# Patient Record
Sex: MALE | Race: WHITE | NOT HISPANIC OR LATINO | Employment: OTHER | URBAN - METROPOLITAN AREA
[De-identification: names, ages, dates, MRNs, and addresses within clinical notes are randomized per-mention and may not be internally consistent; named-entity substitution may affect disease eponyms.]

---

## 2017-01-10 ENCOUNTER — OFFICE VISIT (OUTPATIENT)
Dept: INTERNAL MEDICINE | Facility: CLINIC | Age: 82
End: 2017-01-10
Payer: MEDICARE

## 2017-01-10 VITALS
RESPIRATION RATE: 16 BRPM | BODY MASS INDEX: 34.05 KG/M2 | SYSTOLIC BLOOD PRESSURE: 130 MMHG | DIASTOLIC BLOOD PRESSURE: 80 MMHG | HEART RATE: 64 BPM | WEIGHT: 211.88 LBS | HEIGHT: 66 IN | TEMPERATURE: 97 F

## 2017-01-10 DIAGNOSIS — J06.9 UPPER RESPIRATORY TRACT INFECTION, UNSPECIFIED TYPE: Primary | ICD-10-CM

## 2017-01-10 PROCEDURE — 3079F DIAST BP 80-89 MM HG: CPT | Mod: S$GLB,,, | Performed by: FAMILY MEDICINE

## 2017-01-10 PROCEDURE — 1159F MED LIST DOCD IN RCRD: CPT | Mod: S$GLB,,, | Performed by: FAMILY MEDICINE

## 2017-01-10 PROCEDURE — 99999 PR PBB SHADOW E&M-EST. PATIENT-LVL III: CPT | Mod: PBBFAC,,, | Performed by: FAMILY MEDICINE

## 2017-01-10 PROCEDURE — 99214 OFFICE O/P EST MOD 30 MIN: CPT | Mod: S$GLB,,, | Performed by: FAMILY MEDICINE

## 2017-01-10 PROCEDURE — 1157F ADVNC CARE PLAN IN RCRD: CPT | Mod: S$GLB,,, | Performed by: FAMILY MEDICINE

## 2017-01-10 PROCEDURE — 3075F SYST BP GE 130 - 139MM HG: CPT | Mod: S$GLB,,, | Performed by: FAMILY MEDICINE

## 2017-01-10 PROCEDURE — 1160F RVW MEDS BY RX/DR IN RCRD: CPT | Mod: S$GLB,,, | Performed by: FAMILY MEDICINE

## 2017-01-10 RX ORDER — FLUTICASONE PROPIONATE 50 MCG
2 SPRAY, SUSPENSION (ML) NASAL DAILY
Qty: 16 G | Refills: 11 | Status: SHIPPED | OUTPATIENT
Start: 2017-01-10 | End: 2017-02-09

## 2017-01-10 RX ORDER — PROMETHAZINE HYDROCHLORIDE AND DEXTROMETHORPHAN HYDROBROMIDE 6.25; 15 MG/5ML; MG/5ML
5 SYRUP ORAL 4 TIMES DAILY PRN
Qty: 240 ML | Refills: 0 | Status: SHIPPED | OUTPATIENT
Start: 2017-01-10 | End: 2017-01-20

## 2017-01-10 RX ORDER — AMOXICILLIN AND CLAVULANATE POTASSIUM 600; 42.9 MG/5ML; MG/5ML
875 POWDER, FOR SUSPENSION ORAL 2 TIMES DAILY
Qty: 140 ML | Refills: 0 | Status: SHIPPED | OUTPATIENT
Start: 2017-01-10 | End: 2017-01-20

## 2017-01-10 RX ORDER — DEXTROMETHORPHAN HBR AND GUAIFENESIN 5; 100 MG/5ML; MG/5ML
5 LIQUID ORAL 2 TIMES DAILY
COMMUNITY

## 2017-01-10 NOTE — PATIENT INSTRUCTIONS
D/C Claritin.  Continue Zyrtec 10 mg PO nightly.  Start Flonase nasal spray 2 sprays per nostril daily.

## 2017-01-10 NOTE — PROGRESS NOTES
Subjective:       Patient ID: Salty Farnsworth is a 82 y.o. male.    Chief Complaint: Cough    HPI 82-year-old white male [facility] resident presents to clinic today accompanied by his daughter secondary to a complaint of decreased appetite, fatigue, nasal congestion, was nasal drip, runny nose, cough, and chest congestion worsening since Saturday.  He has been using Mucinex with minimal relief.  There is concerned secondary to multiple sick residents at [facility].   Review of Systems   Constitutional: Positive for appetite change and fatigue. Negative for chills and fever.   HENT: Positive for congestion, postnasal drip and rhinorrhea. Negative for ear pain, hearing loss, sinus pressure, sore throat and tinnitus.    Eyes: Negative for redness, itching and visual disturbance.   Respiratory: Positive for cough and chest tightness. Negative for shortness of breath.    Cardiovascular: Negative for chest pain and palpitations.   Gastrointestinal: Negative for abdominal pain, constipation, diarrhea, nausea and vomiting.   Genitourinary: Negative for decreased urine volume, difficulty urinating, dysuria, frequency, hematuria and urgency.   Musculoskeletal: Negative for back pain, myalgias, neck pain and neck stiffness.   Skin: Negative for rash.   Neurological: Negative for dizziness, light-headedness and headaches.   Psychiatric/Behavioral: Negative.        Objective:      Physical Exam   Constitutional: He is oriented to person, place, and time. He appears well-developed and well-nourished. No distress.   HENT:   Head: Normocephalic and atraumatic.   Right Ear: Hearing, tympanic membrane, external ear and ear canal normal.   Left Ear: Hearing, tympanic membrane, external ear and ear canal normal.   Nose: Mucosal edema and rhinorrhea present. No nose lacerations, sinus tenderness, nasal deformity, septal deviation or nasal septal hematoma. No epistaxis.  No foreign bodies. Right sinus exhibits no maxillary sinus  tenderness and no frontal sinus tenderness. Left sinus exhibits no maxillary sinus tenderness and no frontal sinus tenderness.   Mouth/Throat: Oropharynx is clear and moist. No oropharyngeal exudate.   Eyes: Conjunctivae and EOM are normal. Pupils are equal, round, and reactive to light. Right eye exhibits no discharge. Left eye exhibits no discharge. No scleral icterus.   Neck: Normal range of motion. Neck supple. No JVD present. No tracheal deviation present. No thyromegaly present.   Cardiovascular: Normal rate, regular rhythm, normal heart sounds and intact distal pulses.  Exam reveals no gallop and no friction rub.    No murmur heard.  Pulmonary/Chest: Effort normal and breath sounds normal. No stridor. No respiratory distress. He has no wheezes. He has no rales.   Abdominal: Soft. Bowel sounds are normal. He exhibits no distension and no mass. There is no tenderness. There is no rebound and no guarding.   Musculoskeletal: Normal range of motion. He exhibits no edema or tenderness.   Lymphadenopathy:     He has no cervical adenopathy.   Neurological: He is alert and oriented to person, place, and time.   Skin: Skin is warm and dry. No rash noted. He is not diaphoretic. No erythema. No pallor.   Psychiatric: He has a normal mood and affect. His behavior is normal. Judgment and thought content normal.   Nursing note and vitals reviewed.      Assessment:       1. Upper respiratory tract infection, unspecified type        Plan:       Upper respiratory tract infection, unspecified type  -     amoxicillin-clavulanate (AUGMENTIN) 600-42.9 mg/5 mL SusR; Take 7 mLs (840 mg total) by mouth 2 (two) times daily.  Dispense: 140 mL; Refill: 0  -     fluticasone (FLONASE) 50 mcg/actuation nasal spray; 2 sprays by Each Nare route once daily.  Dispense: 16 g; Refill: 11  -     promethazine-dextromethorphan (PROMETHAZINE-DM) 6.25-15 mg/5 mL Syrp; Take 5 mLs by mouth 4 (four) times daily as needed (Cough).  Dispense: 240 mL;  Refill: 0       Tylenol as needed for fever or pain.  Continue over-the-counter Zyrtec nightly.  Saltwater or Listerine gargle as needed for sore throat.  Return to clinic as needed if symptoms persist or worsen.

## 2017-01-10 NOTE — MR AVS SNAPSHOT
[facility]   [facility]  [place] [address]  Phone: [phone]  Fax: [phone]                  Salty Farnsworth   1/10/2017 2:20 PM   Office Visit    Description:  Male : 1934   Provider:  Hernesto Mason MD   Department:  [facility]           Reason for Visit     Cough           Diagnoses this Visit        Comments    Upper respiratory tract infection, unspecified type    -  Primary            To Do List           Future Appointments        Provider Department Dept Phone    2017 8:00 AM HOME MONITOR DEVICE CHECK, [facility] - Arrhythmia [phone]    3/21/2017 3:00 PM Jonelle Mcdonald MD [facility] Dermatology [phone]      Goals (5 Years of Data)     None      Follow-Up and Disposition     Return if symptoms worsen or fail to improve.       These Medications        Disp Refills Start End    amoxicillin-clavulanate (AUGMENTIN) 600-42.9 mg/5 mL SusR 140 mL 0 1/10/2017 2017    Take 7 mLs (840 mg total) by mouth 2 (two) times daily. - Oral    Pharmacy: [facility] - [place], [place] - [address] Ph #: [phone]       fluticasone (FLONASE) 50 mcg/actuation nasal spray 16 g 11 1/10/2017 2017    2 sprays by Each Nare route once daily. - Each Nare    Pharmacy: [facility] - [place], [place] - [address] Ph #: [phone]       promethazine-dextromethorphan (PROMETHAZINE-DM) 6.25-15 mg/5 mL Syrp 240 mL 0 1/10/2017 2017    Take 5 mLs by mouth 4 (four) times daily as needed (Cough). - Oral    Pharmacy: [facility] - [place], [place] - [address] Ph #: [phone]         [facility] On Call     [facility] On Call Nurse Care Line -  Assistance  Registered nurses in the [facility] On Call Center provide clinical advisement, health education, appointment booking, and other advisory services.  Call for this free service at [phone].             Medications            Message regarding Medications     Verify the changes and/or additions to your medication regime listed below are the same as discussed with your clinician today.  If any of these changes or additions are incorrect, please notify your healthcare provider.        START taking these NEW medications        Refills    amoxicillin-clavulanate (AUGMENTIN) 600-42.9 mg/5 mL SusR 0    Sig: Take 7 mLs (840 mg total) by mouth 2 (two) times daily.    Class: Normal    Route: Oral    fluticasone (FLONASE) 50 mcg/actuation nasal spray 11    Si sprays by Each Nare route once daily.    Class: Normal    Route: Each Nare    promethazine-dextromethorphan (PROMETHAZINE-DM) 6.25-15 mg/5 mL Syrp 0    Sig: Take 5 mLs by mouth 4 (four) times daily as needed (Cough).    Class: Normal    Route: Oral           Verify that the below list of medications is an accurate representation of the medications you are currently taking.  If none reported, the list may be blank. If incorrect, please contact your healthcare provider. Carry this list with you in case of emergency.           Current Medications     apixaban 5 mg Tab Take 1 tablet (5 mg total) by mouth 2 (two) times daily.    aspirin (ECOTRIN) 81 MG EC tablet Take 81 mg by mouth once daily.      cetirizine (ZYRTEC) 10 MG tablet Take 10 mg by mouth once daily.    dextromethorphan-guaifenesin 5-100 mg/5 mL Liqd Take 5 mLs by mouth 2 (two) times daily.    donepezil (ARICEPT) 10 MG tablet TAKE 1 TABLET BY MOUTH EVERY DAY WITH FOOD    finasteride (PROSCAR) 5 mg tablet TAKE 1 TABLET BY MOUTH EVERY DAY    food supplemt, lactose-reduced (ENSURE ACTIVE MUSCLE HEALTH) Liqd Take 118 mLs by mouth 3 (three) times daily with meals.    furosemide (LASIX) 40 MG tablet Take 1 tablet (40 mg total) by mouth once daily.    melatonin 3 mg Lozg Take 1 capsule by mouth every evening.     memantine (NAMENDA) 10 MG Tab Take 1 tablet (10 mg total) by mouth 2 (two) times daily.    paroxetine (PAXIL) 10 MG  "tablet Take 1 tablet (10 mg total) by mouth every morning.    pravastatin (PRAVACHOL) 40 MG tablet Take 1 tablet (40 mg total) by mouth every evening.    amoxicillin-clavulanate (AUGMENTIN) 600-42.9 mg/5 mL SusR Take 7 mLs (840 mg total) by mouth 2 (two) times daily.    fluticasone (FLONASE) 50 mcg/actuation nasal spray 2 sprays by Each Nare route once daily.    promethazine-dextromethorphan (PROMETHAZINE-DM) 6.25-15 mg/5 mL Syrp Take 5 mLs by mouth 4 (four) times daily as needed (Cough).           Clinical Reference Information           Vital Signs - Last Recorded  Most recent update: 1/10/2017  2:45 PM by Jenna aNils LPN    BP Pulse Temp Resp Ht Wt    130/80 (BP Location: Left arm, Patient Position: Sitting, BP Method: Manual) 64 97.4 °F (36.3 °C) (Oral) 16 5' 6" (1.676 m) 96.1 kg (211 lb 13.8 oz)    BMI                34.2 kg/m2          Blood Pressure          Most Recent Value    BP  130/80      Allergies as of 1/10/2017     No Known Allergies      Immunizations Administered on Date of Encounter - 1/10/2017     None      Instructions    D/C Claritin.  Continue Zyrtec 10 mg PO nightly.  Start Flonase nasal spray 2 sprays per nostril daily.        "

## 2017-01-18 ENCOUNTER — TELEPHONE (OUTPATIENT)
Dept: ELECTROPHYSIOLOGY | Facility: CLINIC | Age: 82
End: 2017-01-18

## 2017-01-18 ENCOUNTER — CLINICAL SUPPORT (OUTPATIENT)
Dept: ELECTROPHYSIOLOGY | Facility: CLINIC | Age: 82
End: 2017-01-18
Payer: MEDICARE

## 2017-01-18 DIAGNOSIS — Z95.0 CARDIAC PACEMAKER IN SITU: ICD-10-CM

## 2017-01-18 DIAGNOSIS — R00.1 BRADYCARDIA: ICD-10-CM

## 2017-01-18 PROCEDURE — 93296 REM INTERROG EVL PM/IDS: CPT | Mod: S$GLB,,, | Performed by: INTERNAL MEDICINE

## 2017-01-18 PROCEDURE — 93294 REM INTERROG EVL PM/LDLS PM: CPT | Mod: S$GLB,,, | Performed by: INTERNAL MEDICINE

## 2017-01-18 NOTE — TELEPHONE ENCOUNTER
I spoke with patient daughter and asked if she could send his home pacemaker transmission. She informed me that she will send it this afternoon.

## 2017-02-10 ENCOUNTER — TELEPHONE (OUTPATIENT)
Dept: DERMATOLOGY | Facility: CLINIC | Age: 82
End: 2017-02-10

## 2017-02-10 NOTE — TELEPHONE ENCOUNTER
Left message for patients daughter Krystian to return to reschedule fathers appointment.----- Message from Lianne Cordero sent at 2/8/2017  3:38 PM CST -----  Contact: pts daughter/krystian Rodríguez pt-Pts daughter received a message today that her fathers appt wants changes and that is not a good day.  Please call april Ledesma to reschedule at [phone].

## 2017-02-20 ENCOUNTER — PATIENT MESSAGE (OUTPATIENT)
Dept: INTERNAL MEDICINE | Facility: CLINIC | Age: 82
End: 2017-02-20

## 2017-02-23 ENCOUNTER — TELEPHONE (OUTPATIENT)
Dept: INTERNAL MEDICINE | Facility: CLINIC | Age: 82
End: 2017-02-23

## 2017-02-23 ENCOUNTER — PATIENT MESSAGE (OUTPATIENT)
Dept: INTERNAL MEDICINE | Facility: CLINIC | Age: 82
End: 2017-02-23

## 2017-02-23 NOTE — TELEPHONE ENCOUNTER
----- Message from Althea Burkett sent at 2/23/2017  8:50 AM CST -----  Contact: pt daughter Callie [phone]  Pt daughter will like a call for the pt to receive orders to start receiving physical therapy,please advise

## 2017-02-23 NOTE — TELEPHONE ENCOUNTER
She also emailed us 2/20 and no once responded.  Her dad last saw you in august and had therapy with [facility] in October /november and it help  Him immensely and daughter is requesting home therapy again for debility.    Ok to order?    (i told her we'd call once we got a response from dr duran)    Thanks cuco

## 2017-03-10 RX ORDER — DONEPEZIL HYDROCHLORIDE 10 MG/1
10 TABLET, FILM COATED ORAL DAILY
Qty: 30 TABLET | Refills: 6 | Status: SHIPPED | OUTPATIENT
Start: 2017-03-10 | End: 2017-10-04 | Stop reason: SDUPTHER

## 2017-03-10 RX ORDER — PRAVASTATIN SODIUM 40 MG/1
40 TABLET ORAL NIGHTLY
Qty: 30 TABLET | Refills: 6 | Status: SHIPPED | OUTPATIENT
Start: 2017-03-10 | End: 2018-01-02 | Stop reason: SDUPTHER

## 2017-03-14 ENCOUNTER — LAB VISIT (OUTPATIENT)
Dept: LAB | Facility: HOSPITAL | Age: 82
End: 2017-03-14
Attending: INTERNAL MEDICINE
Payer: MEDICARE

## 2017-03-14 ENCOUNTER — OFFICE VISIT (OUTPATIENT)
Dept: INTERNAL MEDICINE | Facility: CLINIC | Age: 82
End: 2017-03-14
Payer: MEDICARE

## 2017-03-14 VITALS
DIASTOLIC BLOOD PRESSURE: 80 MMHG | SYSTOLIC BLOOD PRESSURE: 110 MMHG | HEIGHT: 66 IN | TEMPERATURE: 97 F | RESPIRATION RATE: 18 BRPM | HEART RATE: 59 BPM | WEIGHT: 211.88 LBS | BODY MASS INDEX: 34.05 KG/M2

## 2017-03-14 DIAGNOSIS — I50.22 CHRONIC SYSTOLIC HEART FAILURE: Chronic | ICD-10-CM

## 2017-03-14 DIAGNOSIS — M48.061 NEURAL FORAMINAL STENOSIS OF LUMBAR SPINE: ICD-10-CM

## 2017-03-14 DIAGNOSIS — I48.0 PAROXYSMAL ATRIAL FIBRILLATION: ICD-10-CM

## 2017-03-14 DIAGNOSIS — R53.81 DEBILITY: Primary | ICD-10-CM

## 2017-03-14 DIAGNOSIS — R41.3 MEMORY LOSS: Chronic | ICD-10-CM

## 2017-03-14 LAB
ALBUMIN SERPL BCP-MCNC: 3.5 G/DL
ALP SERPL-CCNC: 74 U/L
ALT SERPL W/O P-5'-P-CCNC: 8 U/L
ANION GAP SERPL CALC-SCNC: 8 MMOL/L
AST SERPL-CCNC: 11 U/L
BILIRUB SERPL-MCNC: 0.5 MG/DL
BUN SERPL-MCNC: 21 MG/DL
CALCIUM SERPL-MCNC: 8.8 MG/DL
CHLORIDE SERPL-SCNC: 109 MMOL/L
CO2 SERPL-SCNC: 26 MMOL/L
CREAT SERPL-MCNC: 1.3 MG/DL
EST. GFR  (AFRICAN AMERICAN): 58.7 ML/MIN/1.73 M^2
EST. GFR  (NON AFRICAN AMERICAN): 50.8 ML/MIN/1.73 M^2
GLUCOSE SERPL-MCNC: 92 MG/DL
POTASSIUM SERPL-SCNC: 4.1 MMOL/L
PROT SERPL-MCNC: 7 G/DL
SODIUM SERPL-SCNC: 143 MMOL/L

## 2017-03-14 PROCEDURE — 36415 COLL VENOUS BLD VENIPUNCTURE: CPT | Mod: PO

## 2017-03-14 PROCEDURE — 1157F ADVNC CARE PLAN IN RCRD: CPT | Mod: S$GLB,,, | Performed by: INTERNAL MEDICINE

## 2017-03-14 PROCEDURE — 3079F DIAST BP 80-89 MM HG: CPT | Mod: S$GLB,,, | Performed by: INTERNAL MEDICINE

## 2017-03-14 PROCEDURE — 3074F SYST BP LT 130 MM HG: CPT | Mod: S$GLB,,, | Performed by: INTERNAL MEDICINE

## 2017-03-14 PROCEDURE — 1160F RVW MEDS BY RX/DR IN RCRD: CPT | Mod: S$GLB,,, | Performed by: INTERNAL MEDICINE

## 2017-03-14 PROCEDURE — 99499 UNLISTED E&M SERVICE: CPT | Mod: S$GLB,,, | Performed by: INTERNAL MEDICINE

## 2017-03-14 PROCEDURE — 1126F AMNT PAIN NOTED NONE PRSNT: CPT | Mod: S$GLB,,, | Performed by: INTERNAL MEDICINE

## 2017-03-14 PROCEDURE — 1159F MED LIST DOCD IN RCRD: CPT | Mod: S$GLB,,, | Performed by: INTERNAL MEDICINE

## 2017-03-14 PROCEDURE — 99999 PR PBB SHADOW E&M-EST. PATIENT-LVL III: CPT | Mod: PBBFAC,,,

## 2017-03-14 PROCEDURE — 80053 COMPREHEN METABOLIC PANEL: CPT

## 2017-03-14 PROCEDURE — 99214 OFFICE O/P EST MOD 30 MIN: CPT | Mod: S$GLB,,, | Performed by: INTERNAL MEDICINE

## 2017-03-14 NOTE — PROGRESS NOTES
Patient seen and examined with resident and agree with assessment and plan.    1.  Debility  2.  Memory loss  3.  Chronic systolic heart failure  4.  Neural foraminal stenosis  5. Paroxysmal a fib    Home health referral for PT/OT/ST, nurse, aide, .  Lasix as needed monitored by daughters.  Check CMP today.

## 2017-03-14 NOTE — PROGRESS NOTES
Subjective:       Patient ID: Salty Farnsworth is a 82 y.o. male.    Chief Complaint: Follow-up (would like to have PT restarted)    HPI     Mr. Farnsworth presents with his daughter to clinic to obtain Home Health Referall to continue PT. He had HH PT after a hospitalization last year but it has since . She notices that he is more skakey, unsteady and weak without the PT. He lives in an assisted living facility.   Another daughter is also concerned about his legs. He takes lasix as needed for swelling due to systolic CHF. His daughters measure his legs and he takes the lasix if circumference is greater than 11 inches.   He has atrial fibrillation noted on pacemaker interrogation. He follows with cardiology for this. He is on apixiban.   Review of Systems   Unable to perform ROS: Dementia       Objective:      Physical Exam   Constitutional: He appears well-developed and well-nourished.   HENT:   Head: Normocephalic and atraumatic.   Mouth/Throat: No oropharyngeal exudate.   Eyes: Conjunctivae and EOM are normal. Pupils are equal, round, and reactive to light.   Pupils 3mm - equally reactive to light, constrict to 2mm   Neck: Normal range of motion. Neck supple. No thyromegaly present.   Cardiovascular: Normal rate, regular rhythm and normal heart sounds.    No murmur heard.  Pulmonary/Chest: Effort normal and breath sounds normal. He has no wheezes.   Abdominal: Soft. Bowel sounds are normal. He exhibits no distension. There is no tenderness.   Musculoskeletal: Normal range of motion. He exhibits edema (1+ BLE).   Neurological: He is alert.   Skin: Skin is warm and dry. No rash noted.   Vitals reviewed.      Assessment:       1. Debility    2. Memory loss    3. Chronic systolic heart failure    4. Neural foraminal stenosis of lumbar spine, at left L3-L4.    5. Paroxysmal atrial fibrillation        Plan:       Referral for home health to continue PT, speech therapy for swallowing, OT, SW to provide aid and future  planning, nursing for VS and weight check.  Continue lasix prn. Check CMP today to assess electrolytes, kidney function, and transaminases to eval for any passive liver congestion.   Continue apixiban for paroxsymal atrial fibrillation. Keep cardiology follow up with 2DE monitoring of systolic heart failure.    Will call patient's daughter Callie with lab results and release to patient portal.    Discussed with Dr. Cooper Banks MD   IM PGY3

## 2017-03-15 ENCOUNTER — TELEPHONE (OUTPATIENT)
Dept: INTERNAL MEDICINE | Facility: CLINIC | Age: 82
End: 2017-03-15

## 2017-03-20 ENCOUNTER — TELEPHONE (OUTPATIENT)
Dept: ADMINISTRATIVE | Facility: CLINIC | Age: 82
End: 2017-03-20

## 2017-03-22 ENCOUNTER — LAB VISIT (OUTPATIENT)
Dept: LAB | Facility: HOSPITAL | Age: 82
End: 2017-03-22
Attending: INTERNAL MEDICINE
Payer: MEDICARE

## 2017-03-22 ENCOUNTER — TELEPHONE (OUTPATIENT)
Dept: INTERNAL MEDICINE | Facility: CLINIC | Age: 82
End: 2017-03-22

## 2017-03-22 DIAGNOSIS — N39.0 URINARY TRACT INFECTION, SITE NOT SPECIFIED: Primary | ICD-10-CM

## 2017-03-22 PROCEDURE — 81001 URINALYSIS AUTO W/SCOPE: CPT

## 2017-03-22 PROCEDURE — 87086 URINE CULTURE/COLONY COUNT: CPT

## 2017-03-22 NOTE — TELEPHONE ENCOUNTER
----- Message from Donna Hodge sent at 3/22/2017  8:21 AM CDT -----  Contact: [facility] [place] xt [phone]  Pt was admitted to home health service on 3/15/17 and needed therapy only at that time. Daughter reported that patient has blood in his urine this morning. [facility] is requesting an order to visit patient and also to collect a specimen/ cath to check for a uti.

## 2017-03-23 ENCOUNTER — TELEPHONE (OUTPATIENT)
Dept: INTERNAL MEDICINE | Facility: CLINIC | Age: 82
End: 2017-03-23

## 2017-03-23 DIAGNOSIS — R31.9 HEMATURIA: Primary | ICD-10-CM

## 2017-03-23 LAB
BACTERIA #/AREA URNS AUTO: ABNORMAL /HPF
BILIRUB UR QL STRIP: NEGATIVE
CAOX CRY UR QL COMP ASSIST: ABNORMAL
CLARITY UR REFRACT.AUTO: ABNORMAL
COLOR UR AUTO: ABNORMAL
GLUCOSE UR QL STRIP: NEGATIVE
HGB UR QL STRIP: ABNORMAL
HYALINE CASTS UR QL AUTO: 0 /LPF
KETONES UR QL STRIP: NEGATIVE
LEUKOCYTE ESTERASE UR QL STRIP: NEGATIVE
MICROSCOPIC COMMENT: ABNORMAL
NITRITE UR QL STRIP: NEGATIVE
PH UR STRIP: 5 [PH] (ref 5–8)
PROT UR QL STRIP: ABNORMAL
RBC #/AREA URNS AUTO: >100 /HPF (ref 0–4)
SP GR UR STRIP: 1.02 (ref 1–1.03)
URN SPEC COLLECT METH UR: ABNORMAL
UROBILINOGEN UR STRIP-ACNC: ABNORMAL EU/DL
WBC #/AREA URNS AUTO: 2 /HPF (ref 0–5)

## 2017-03-23 NOTE — TELEPHONE ENCOUNTER
Patient has a lot of blood in his urine.  There are no signs of infection.  Let's await urine culture.  Need to repeat this Monday.  If it is still bloody, going to refer to urology.  Released to patient portal.

## 2017-03-24 LAB — BACTERIA UR CULT: NO GROWTH

## 2017-03-28 ENCOUNTER — TELEPHONE (OUTPATIENT)
Dept: INTERNAL MEDICINE | Facility: CLINIC | Age: 82
End: 2017-03-28

## 2017-03-28 ENCOUNTER — OFFICE VISIT (OUTPATIENT)
Dept: UROLOGY | Facility: CLINIC | Age: 82
End: 2017-03-28
Payer: MEDICARE

## 2017-03-28 VITALS
WEIGHT: 210 LBS | BODY MASS INDEX: 31.83 KG/M2 | SYSTOLIC BLOOD PRESSURE: 124 MMHG | DIASTOLIC BLOOD PRESSURE: 59 MMHG | HEIGHT: 68 IN | HEART RATE: 54 BPM

## 2017-03-28 DIAGNOSIS — N40.1 BPH (BENIGN PROSTATIC HYPERTROPHY) WITH URINARY OBSTRUCTION: Primary | Chronic | ICD-10-CM

## 2017-03-28 DIAGNOSIS — R31.0 HEMATURIA, GROSS: ICD-10-CM

## 2017-03-28 DIAGNOSIS — N13.8 BPH (BENIGN PROSTATIC HYPERTROPHY) WITH URINARY OBSTRUCTION: Primary | Chronic | ICD-10-CM

## 2017-03-28 PROCEDURE — 1157F ADVNC CARE PLAN IN RCRD: CPT | Mod: S$GLB,,, | Performed by: UROLOGY

## 2017-03-28 PROCEDURE — 99204 OFFICE O/P NEW MOD 45 MIN: CPT | Mod: S$GLB,,, | Performed by: UROLOGY

## 2017-03-28 PROCEDURE — 1126F AMNT PAIN NOTED NONE PRSNT: CPT | Mod: S$GLB,,, | Performed by: UROLOGY

## 2017-03-28 PROCEDURE — 3074F SYST BP LT 130 MM HG: CPT | Mod: S$GLB,,, | Performed by: UROLOGY

## 2017-03-28 PROCEDURE — 99999 PR PBB SHADOW E&M-EST. PATIENT-LVL III: CPT | Mod: PBBFAC,,, | Performed by: UROLOGY

## 2017-03-28 PROCEDURE — 1159F MED LIST DOCD IN RCRD: CPT | Mod: S$GLB,,, | Performed by: UROLOGY

## 2017-03-28 PROCEDURE — 3078F DIAST BP <80 MM HG: CPT | Mod: S$GLB,,, | Performed by: UROLOGY

## 2017-03-28 PROCEDURE — 1160F RVW MEDS BY RX/DR IN RCRD: CPT | Mod: S$GLB,,, | Performed by: UROLOGY

## 2017-03-28 RX ORDER — LIDOCAINE HYDROCHLORIDE 20 MG/ML
JELLY TOPICAL ONCE
Status: CANCELLED | OUTPATIENT
Start: 2017-03-28 | End: 2017-03-28

## 2017-03-28 RX ORDER — CIPROFLOXACIN 250 MG/1
500 TABLET, FILM COATED ORAL ONCE
Status: CANCELLED | OUTPATIENT
Start: 2017-03-28 | End: 2017-03-28

## 2017-03-28 RX ORDER — POLYETHYLENE GLYCOL 3350 17 G/17G
POWDER, FOR SOLUTION ORAL
COMMUNITY

## 2017-03-28 NOTE — TELEPHONE ENCOUNTER
----- Message from Sharlene Nance sent at 3/28/2017  8:26 AM CDT -----  Contact: Callie/ daughter/ [phone]  Daughter would like to speak with Dr. Gamboa about her father appointment with the urologist.

## 2017-03-28 NOTE — TELEPHONE ENCOUNTER
----- Message from Sharlene Nance sent at 3/28/2017  8:26 AM CDT -----  Contact: Callie/ daughter/ [phone]  Daughter would like to speak with Dr. Gamboa about her father appointment with the urologist.

## 2017-03-28 NOTE — LETTER
March 28, 2017      Deepak Gamboa MD  [address] [facility] [place] [address]           [facility] - Urology 4th Floor  [address] [place]  [place] [address]  Phone: [phone]          Patient: Salty Farnsworth   MR Number: [id number]   YOB: 1934   Date of Visit: 3/28/2017       Dear Dr. Deepak Gamboa:    Thank you for referring Salty Farnsworth to me for evaluation. Attached you will find relevant portions of my assessment and plan of care.    If you have questions, please do not hesitate to call me. I look forward to following Salty Farnsworth along with you.    Sincerely,    Wilfredo Ledezma MD    Enclosure  CC:  No Recipients    If you would like to receive this communication electronically, please contact [email or web address] or [phone] to request more information on [facility] access.    For providers and/or their staff who would like to refer a patient to [facility], please contact us through our one-stop-shop provider referral line, Pastora Wilson, at [phone].    If you feel you have received this communication in error or would no longer like to receive these types of communications, please e-mail [email or web address]

## 2017-03-28 NOTE — PROGRESS NOTES
CC: hematuria    Salty Farnsworth is a 82 y.o. man who is here for the evaluation of Hematuria (painless hematuria started about 2 weeks ago, lives at Assisted Living)  a new pt referred by his PCP.  Hx of severe dementia, AF on Eliquis anti-coagulation therapy.  Uses a diaper for urination, and his diapers showed bloody urine for the past 2 wks or so.  Denies dysuria or fever, but unable to assess adequately because he does not talk much.  He is accompanied by his two daughters today.  Has been on Finasteride for BPH.    Past Medical History:   Diagnosis Date    Anxiety 8/13/2014    Arthritis     Cataract     CHF (congestive heart failure)     Dementia     Hematuria     Hyperlipidemia     Hypertension     Oropharyngeal dysphagia 12/4/2015    Aspiration of thin liquids    Pacemaker     Sleep apnea     Weakness      Past Surgical History:   Procedure Laterality Date    CARDIAC PACEMAKER PLACEMENT      HERNIA REPAIR       Social History   Substance Use Topics    Smoking status: Never Smoker    Smokeless tobacco: Never Used    Alcohol use No     Family History   Problem Relation Age of Onset    Dementia Mother     Cataracts Mother     Dementia Sister     Stroke Sister      ischemic stroke    Cataracts Father     Heart disease Father      MI    Melanoma Neg Hx     Amblyopia Neg Hx     Blindness Neg Hx     Diabetes Neg Hx     Glaucoma Neg Hx     Hypertension Neg Hx     Macular degeneration Neg Hx     Retinal detachment Neg Hx     Strabismus Neg Hx     Thyroid disease Neg Hx     Cancer Neg Hx     Colon cancer Neg Hx     Prostate cancer Neg Hx     Hyperlipidemia Neg Hx      Allergy:  Review of patient's allergies indicates:  No Known Allergies  Outpatient Encounter Prescriptions as of 3/28/2017   Medication Sig Dispense Refill    cetirizine (ZYRTEC) 10 MG tablet Take 10 mg by mouth once daily.      dextromethorphan-guaifenesin 5-100 mg/5 mL Liqd Take 5 mLs by mouth 2 (two) times daily.       donepezil (ARICEPT) 10 MG tablet Take 1 tablet (10 mg total) by mouth once daily. 30 tablet 6    finasteride (PROSCAR) 5 mg tablet TAKE 1 TABLET BY MOUTH EVERY DAY 30 tablet 0    food supplemt, lactose-reduced (ENSURE ACTIVE MUSCLE HEALTH) Liqd Take 118 mLs by mouth 3 (three) times daily with meals. 90 Can 5    furosemide (LASIX) 40 MG tablet Take 1 tablet (40 mg total) by mouth once daily. (Patient taking differently: Take 40 mg by mouth as needed. ) 30 tablet 11    melatonin 3 mg Lozg Take 1 capsule by mouth every evening.       memantine (NAMENDA) 10 MG Tab Take 1 tablet (10 mg total) by mouth 2 (two) times daily. 60 tablet 11    paroxetine (PAXIL) 10 MG tablet Take 1 tablet (10 mg total) by mouth every morning. 30 tablet 11    polyethylene glycol (GLYCOLAX) 17 gram PwPk Take by mouth.      pravastatin (PRAVACHOL) 40 MG tablet Take 1 tablet (40 mg total) by mouth every evening. 30 tablet 6    apixaban 5 mg Tab Take 1 tablet (5 mg total) by mouth 2 (two) times daily. 60 tablet 11     No facility-administered encounter medications on file as of 3/28/2017.      Review of Systems   General ROS: GENERAL:  No weight gain or loss  SKIN:  No rashes or lacerations  HEAD:  No headaches  EYES:  No exophthalmos, jaundice or blindness  EARS:  No dizziness, tinnitus or hearing loss  NOSE:  No changes in smell  MOUTH & THROAT:  No dyskinetic movements or obvious goiter  CHEST:  No shortness of breath, hyperventilation or cough  CARDIOVASCULAR:  No tachycardia or chest pain  ABDOMEN:  No nausea, vomiting, pain, constipation or diarrhea  URINARY:  No frequency, dysuria or sexual dysfunction  ENDOCRINE:  No polydipsia, polyuria  MUSCULOSKELETAL:  No pain or stiffness of the joints  NEUROLOGIC:  No weakness, sensory changes, seizures, confusion, memory loss, tremor or other abnormal movements  Physical Exam     Vitals:    03/28/17 1407   BP: (!) 124/59   Pulse: (!) 54     General Appearance:  On a wheel  chair.  Alert, cooperative, no distress, appears stated age   Head:  Normocephalic, without obvious abnormality, atraumatic   Eyes:  PERRL, conjunctiva/corneas clear, EOM's intact, fundi benign, both eyes   Ears:  Normal TM's and external ear canals, both ears   Nose: Nares normal, septum midline, mucosa normal, no drainage or sinus tenderness   Throat: Lips, mucosa, and tongue normal; teeth and gums normal   Neck: Supple, symmetrical, trachea midline, no adenopathy, thyroid: not enlarged, symmetric, no tenderness/mass/nodules, no carotid bruit or JVD   Back:   Symmetric, no curvature, ROM normal, no CVA tenderness   Lungs:   Clear to auscultation bilaterally, respirations unlabored   Chest Wall:  No tenderness or deformity   Heart:  Regular rate and rhythm, S1, S2 normal, no murmur, rub or gallop   Abdomen:   Soft, non-tender, bowel sounds active all four quadrants,  no masses, no organomegaly of liver and spleen  No hernia noted           Extremities: Extremities normal, atraumatic, no cyanosis or edema   Pulses: 2+ and symmetric   Skin: Skin color, texture, turgor normal, no rashes or lesions   Lymph nodes: Cervical, supraclavicular, and axillary nodes normal         Prostate deferred.     LABS:  Lab Results   Component Value Date    PSA 3.9 10/26/2012    PSA 3.49 10/07/2011    PSA 3.4 10/06/2010    PSA 3.4 02/01/2010    PSA 3.7 07/30/2009    PSA 3.2 03/30/2009    PSA 3.2 01/29/2009    PSA 3.9 04/21/2008    PSA 6.6 (H) 03/06/2008    PSA 4.2 (H) 02/23/2007     Results for orders placed or performed in visit on 10/26/12   Prostate Specific Antigen, Diagnostic   Result Value Ref Range    PSA, SCREEN 3.9 0 - 4.0 ng/ml     Lab Results   Component Value Date    CREATININE 1.3 03/14/2017    CREATININE 1.1 08/09/2016    CREATININE 1.2 08/02/2016     No results found for this or any previous visit.  Urine Culture, Routine   Date Value Ref Range Status   03/22/2017 No growth  Final       Assessment and Plan:  Salty dmuont  seen today for hematuria.    Diagnoses and all orders for this visit:    BPH (benign prostatic hypertrophy) with urinary obstruction    Hematuria, gross  -     CBC Without Differential; Future  -     CT Urogram Abd Pelvis W WO; Future  -     Cystoscopy; Future  -     lidocaine HCl 2% urojet; Place into the urethra once.  -     ciprofloxacin HCl tablet 500 mg; Take 2 tablets (500 mg total) by mouth once.  -     Cytology, urine; Standing    will proceed with a full hematuria work up, i.e. Ct urogram for upper tract evaluation and cysto for lower tract evaluation.  I suggested that his cardiologist should be aware of having hematuria since he started Eliquis (Apixaban) not too long ago.  Continue Finasteride.    Follow-up:  Return for cysto.

## 2017-03-29 ENCOUNTER — PATIENT MESSAGE (OUTPATIENT)
Dept: ELECTROPHYSIOLOGY | Facility: CLINIC | Age: 82
End: 2017-03-29

## 2017-03-30 ENCOUNTER — PATIENT MESSAGE (OUTPATIENT)
Dept: ELECTROPHYSIOLOGY | Facility: CLINIC | Age: 82
End: 2017-03-30

## 2017-03-30 ENCOUNTER — TELEPHONE (OUTPATIENT)
Dept: ELECTROPHYSIOLOGY | Facility: CLINIC | Age: 82
End: 2017-03-30

## 2017-03-30 NOTE — TELEPHONE ENCOUNTER
Called pt's daughter back after message received regarding hematuria. Discussed with Dr. West, and advised ok to hold eliquis until work up with Dr Ledezma completed. Daughter verbalized understanding. She will follow up after work up completed. Also sent in message for daughter to forward to assisted living center.

## 2017-04-06 ENCOUNTER — HOSPITAL ENCOUNTER (OUTPATIENT)
Dept: RADIOLOGY | Facility: HOSPITAL | Age: 82
Discharge: HOME | End: 2017-04-06
Attending: UROLOGY
Payer: MEDICARE

## 2017-04-06 DIAGNOSIS — R31.0 HEMATURIA, GROSS: ICD-10-CM

## 2017-04-06 PROCEDURE — 74178 CT ABD&PLV WO CNTR FLWD CNTR: CPT | Mod: TC

## 2017-04-06 PROCEDURE — 25500020 PHARM REV CODE 255: Performed by: UROLOGY

## 2017-04-06 PROCEDURE — 74178 CT ABD&PLV WO CNTR FLWD CNTR: CPT | Mod: 26,,, | Performed by: RADIOLOGY

## 2017-04-06 RX ADMIN — IOHEXOL 125 ML: 350 INJECTION, SOLUTION INTRAVENOUS at 02:04

## 2017-04-07 ENCOUNTER — TELEPHONE (OUTPATIENT)
Dept: UROLOGY | Facility: CLINIC | Age: 82
End: 2017-04-07

## 2017-04-07 NOTE — TELEPHONE ENCOUNTER
"Spoke with daughter, Daxa, about Dad's gross hematuria and rescheduled cystoscopy. Notified per Dr. Longoria that there is a 4 mm stone in left distal ureter which may be causing hematuria. Told her to increase fluids if not on fluid restrictions. States that hematuria has improved since stopping blood thinner. Patient has dementia and is non verbal. Instructed to bring patient to emergency room if becomes "ill" - fever, pain, increased dementia, nausea, vomiting.Verbalized understanding.    "

## 2017-04-10 ENCOUNTER — TELEPHONE (OUTPATIENT)
Dept: UROLOGY | Facility: CLINIC | Age: 82
End: 2017-04-10

## 2017-04-10 ENCOUNTER — TELEPHONE (OUTPATIENT)
Dept: ELECTROPHYSIOLOGY | Facility: CLINIC | Age: 82
End: 2017-04-10

## 2017-04-10 NOTE — TELEPHONE ENCOUNTER
----- Message from Genie Adam sent at 4/10/2017  9:50 AM CDT -----  Contact: Callie Guzman   Pt daughter says you all were emailing back and forth, and she has not heard anything in a few days and would like a call from you. Please call at the number listed.    Thanks

## 2017-04-10 NOTE — TELEPHONE ENCOUNTER
----- Message from Becca Tavarez RN sent at 4/10/2017  4:17 PM CDT -----  Contact: Pt bari Callie:[phone]  Adal Fenton,     The patient may have an underlying  issue, and even though he stopped the Eliquis and has cleared up, he is still undergoing  work up so Dr. West will not clear him to resume.     If Dr Ledezma or one of the physicians in the group says ok to resume, we are fine with that.     I am happy to call the pt's daughter back to discuss- would just need the OK from one of your physicians to resume until cystoscope if that is recommended/safe to do.     Becca  ----- Message -----     From: Laureen Iraheta MA     Sent: 4/10/2017   3:15 PM       To: Becca Tavarez RN        ----- Message -----     From: Jossy Harrell LPN     Sent: 4/10/2017   2:59 PM       To: Brett Lorenzo Staff    Hi, patient had hematuria and was told to hold blood thinner until work up completed. His cystoscope is scheduled for 5.9.2017 and we do not have anything sooner. The daughter does not want to hold it that long. She reports that the urine has cleared up and there is no visible blood in the urine. She is asking for permission to restart rx. Can you please call to discuss. Thanks   ----- Message -----     From: Magi Herrera     Sent: 4/10/2017   2:02 PM       To: Darien RAYMUNDO Staff    Pt daughter called and states she would like to speak with 's nurse in regards to the pt blood thinner medication.

## 2017-04-10 NOTE — TELEPHONE ENCOUNTER
Called pt's daughter back, no answer. Left voicemail for pt's daughter and sent reply message through my [facility].

## 2017-04-13 ENCOUNTER — PATIENT MESSAGE (OUTPATIENT)
Dept: UROLOGY | Facility: CLINIC | Age: 82
End: 2017-04-13

## 2017-04-17 ENCOUNTER — TELEPHONE (OUTPATIENT)
Dept: UROLOGY | Facility: CLINIC | Age: 82
End: 2017-04-17

## 2017-04-17 DIAGNOSIS — N20.1 LEFT URETERAL STONE: Primary | ICD-10-CM

## 2017-04-17 NOTE — TELEPHONE ENCOUNTER
CT urogram done on 4/6/17 showed incidental finding of left distal ureteral stone.  I talked his daughter regarding this finding and options of treatment.  She wanted to follow him conservatively.  Thus will repeat CT RSS and KUB on 5/9/17 when he comes back for cysto at 9 am.    Diagnoses and all orders for this visit:    Left ureteral stone  -     X-Ray Abdomen AP 1 View; Future  -     CT Renal Stone Study ABD Pelvis WO; Future

## 2017-04-20 ENCOUNTER — TELEPHONE (OUTPATIENT)
Dept: UROLOGY | Facility: CLINIC | Age: 82
End: 2017-04-20

## 2017-04-20 NOTE — TELEPHONE ENCOUNTER
----- Message from Kaleigh Aranda sent at 4/19/2017 10:03 AM CDT -----  Contact: pt's daughter Callie [phone]  Callie is asking to speak with the nurse. Callie states the transportation service will not be able to bring pt for 7:45am CT because they do not start that early.Please call Callie.

## 2017-04-21 ENCOUNTER — TELEPHONE (OUTPATIENT)
Dept: ELECTROPHYSIOLOGY | Facility: CLINIC | Age: 82
End: 2017-04-21

## 2017-04-21 NOTE — TELEPHONE ENCOUNTER
I spoke with patients daughter and asked that she send her dads remote pacemaker transmission. She informed me that she did not get a reminder letter but will send it today.

## 2017-04-24 ENCOUNTER — CLINICAL SUPPORT (OUTPATIENT)
Dept: ELECTROPHYSIOLOGY | Facility: CLINIC | Age: 82
End: 2017-04-24
Payer: MEDICARE

## 2017-04-24 DIAGNOSIS — Z95.0 CARDIAC PACEMAKER IN SITU: ICD-10-CM

## 2017-04-24 DIAGNOSIS — R00.1 BRADYCARDIA: ICD-10-CM

## 2017-04-24 PROCEDURE — 93296 REM INTERROG EVL PM/IDS: CPT | Mod: S$GLB,,, | Performed by: INTERNAL MEDICINE

## 2017-04-24 PROCEDURE — 93294 REM INTERROG EVL PM/LDLS PM: CPT | Mod: S$GLB,,, | Performed by: INTERNAL MEDICINE

## 2017-04-25 DIAGNOSIS — I50.22 CHRONIC SYSTOLIC HEART FAILURE: ICD-10-CM

## 2017-04-25 DIAGNOSIS — Z95.0 PACEMAKER: Primary | ICD-10-CM

## 2017-04-25 DIAGNOSIS — I44.0 FIRST DEGREE ATRIOVENTRICULAR BLOCK: ICD-10-CM

## 2017-04-25 DIAGNOSIS — I48.0 PAROXYSMAL ATRIAL FIBRILLATION: ICD-10-CM

## 2017-05-04 ENCOUNTER — HOSPITAL ENCOUNTER (OUTPATIENT)
Dept: RADIOLOGY | Facility: HOSPITAL | Age: 82
Discharge: HOME | End: 2017-05-04
Attending: UROLOGY
Payer: MEDICARE

## 2017-05-04 DIAGNOSIS — N20.1 LEFT URETERAL STONE: ICD-10-CM

## 2017-05-04 PROCEDURE — 74000 XR ABDOMEN AP 1 VIEW: CPT | Mod: TC

## 2017-05-04 PROCEDURE — 74176 CT ABD & PELVIS W/O CONTRAST: CPT | Mod: 26,,, | Performed by: RADIOLOGY

## 2017-05-04 PROCEDURE — 74000 XR ABDOMEN AP 1 VIEW: CPT | Mod: 26,,, | Performed by: RADIOLOGY

## 2017-05-04 PROCEDURE — 74176 CT ABD & PELVIS W/O CONTRAST: CPT | Mod: TC

## 2017-05-05 RX ORDER — PAROXETINE 10 MG/1
10 TABLET, FILM COATED ORAL EVERY MORNING
Qty: 30 TABLET | Refills: 11 | Status: SHIPPED | OUTPATIENT
Start: 2017-05-05 | End: 2018-05-05

## 2017-05-05 RX ORDER — FINASTERIDE 5 MG/1
5 TABLET, FILM COATED ORAL DAILY
Qty: 30 TABLET | Refills: 11 | Status: SHIPPED | OUTPATIENT
Start: 2017-05-05 | End: 2017-05-09 | Stop reason: SDUPTHER

## 2017-05-09 ENCOUNTER — HOSPITAL ENCOUNTER (OUTPATIENT)
Dept: UROLOGY | Facility: HOSPITAL | Age: 82
Discharge: HOME | End: 2017-05-09
Attending: UROLOGY
Payer: MEDICARE

## 2017-05-09 VITALS
RESPIRATION RATE: 16 BRPM | WEIGHT: 220 LBS | HEIGHT: 65 IN | HEART RATE: 56 BPM | DIASTOLIC BLOOD PRESSURE: 71 MMHG | TEMPERATURE: 98 F | SYSTOLIC BLOOD PRESSURE: 142 MMHG | BODY MASS INDEX: 36.65 KG/M2

## 2017-05-09 DIAGNOSIS — N40.1 BPH (BENIGN PROSTATIC HYPERTROPHY) WITH URINARY OBSTRUCTION: Primary | Chronic | ICD-10-CM

## 2017-05-09 DIAGNOSIS — N21.0 BLADDER STONE: ICD-10-CM

## 2017-05-09 DIAGNOSIS — R31.0 HEMATURIA, GROSS: ICD-10-CM

## 2017-05-09 DIAGNOSIS — N13.8 BPH (BENIGN PROSTATIC HYPERTROPHY) WITH URINARY OBSTRUCTION: Primary | Chronic | ICD-10-CM

## 2017-05-09 PROCEDURE — 52000 CYSTOURETHROSCOPY: CPT

## 2017-05-09 PROCEDURE — 88112 CYTOPATH CELL ENHANCE TECH: CPT | Performed by: PATHOLOGY

## 2017-05-09 PROCEDURE — 52000 CYSTOURETHROSCOPY: CPT | Mod: ,,, | Performed by: UROLOGY

## 2017-05-09 PROCEDURE — 25000003 PHARM REV CODE 250: Performed by: UROLOGY

## 2017-05-09 RX ORDER — TAMSULOSIN HYDROCHLORIDE 0.4 MG/1
0.4 CAPSULE ORAL DAILY
Qty: 30 CAPSULE | Refills: 11 | Status: SHIPPED | OUTPATIENT
Start: 2017-05-09 | End: 2017-05-09 | Stop reason: SDUPTHER

## 2017-05-09 RX ORDER — TAMSULOSIN HYDROCHLORIDE 0.4 MG/1
0.4 CAPSULE ORAL DAILY
Qty: 30 CAPSULE | Refills: 11 | Status: SHIPPED | OUTPATIENT
Start: 2017-05-09

## 2017-05-09 RX ORDER — FINASTERIDE 5 MG/1
5 TABLET, FILM COATED ORAL DAILY
Qty: 30 TABLET | Refills: 11 | Status: SHIPPED | OUTPATIENT
Start: 2017-05-09

## 2017-05-09 RX ORDER — LIDOCAINE HYDROCHLORIDE 20 MG/ML
JELLY TOPICAL ONCE
Status: COMPLETED | OUTPATIENT
Start: 2017-05-09 | End: 2017-05-09

## 2017-05-09 RX ORDER — CIPROFLOXACIN 500 MG/1
500 TABLET ORAL ONCE
Status: COMPLETED | OUTPATIENT
Start: 2017-05-09 | End: 2017-05-09

## 2017-05-09 RX ORDER — FINASTERIDE 5 MG/1
5 TABLET, FILM COATED ORAL DAILY
Qty: 30 TABLET | Refills: 11 | Status: SHIPPED | OUTPATIENT
Start: 2017-05-09 | End: 2017-05-09 | Stop reason: SDUPTHER

## 2017-05-09 RX ADMIN — CIPROFLOXACIN HYDROCHLORIDE 500 MG: 250 TABLET, FILM COATED ORAL at 10:05

## 2017-05-09 RX ADMIN — LIDOCAINE HYDROCHLORIDE: 20 JELLY TOPICAL at 09:05

## 2017-05-09 NOTE — H&P
CC: hematuria     Salty Farnsworth is a 82 y.o. man who is here for the evaluation of Hematuria (painless hematuria started about 2 weeks ago, lives [facility])  a new pt referred by his PCP.  Hx of severe dementia, AF on Eliquis anti-coagulation therapy.  Uses a diaper for urination, and his diapers showed bloody urine for the past 2 wks or so.  Denies dysuria or fever, but unable to assess adequately because he does not talk much.  He is accompanied by his two daughters today.  Has been on Finasteride for BPH.          Past Medical History:   Diagnosis Date    Anxiety 8/13/2014    Arthritis      Cataract      CHF (congestive heart failure)      Dementia      Hematuria      Hyperlipidemia      Hypertension      Oropharyngeal dysphagia 12/4/2015     Aspiration of thin liquids    Pacemaker      Sleep apnea      Weakness              Past Surgical History:   Procedure Laterality Date    CARDIAC PACEMAKER PLACEMENT        HERNIA REPAIR               Social History   Substance Use Topics    Smoking status: Never Smoker    Smokeless tobacco: Never Used    Alcohol use No             Family History   Problem Relation Age of Onset    Dementia Mother      Cataracts Mother      Dementia Sister      Stroke Sister         ischemic stroke    Cataracts Father      Heart disease Father         MI    Melanoma Neg Hx      Amblyopia Neg Hx      Blindness Neg Hx      Diabetes Neg Hx      Glaucoma Neg Hx      Hypertension Neg Hx      Macular degeneration Neg Hx      Retinal detachment Neg Hx      Strabismus Neg Hx      Thyroid disease Neg Hx      Cancer Neg Hx      Colon cancer Neg Hx      Prostate cancer Neg Hx      Hyperlipidemia Neg Hx        Allergy:  Review of patient's allergies indicates:  No Known Allergies   Encounter Medications           Outpatient Encounter Prescriptions as of 3/28/2017   Medication Sig Dispense Refill    cetirizine (ZYRTEC) 10 MG tablet Take 10 mg by mouth once  daily.        dextromethorphan-guaifenesin 5-100 mg/5 mL Liqd Take 5 mLs by mouth 2 (two) times daily.        donepezil (ARICEPT) 10 MG tablet Take 1 tablet (10 mg total) by mouth once daily. 30 tablet 6    finasteride (PROSCAR) 5 mg tablet TAKE 1 TABLET BY MOUTH EVERY DAY 30 tablet 0    food supplemt, lactose-reduced (ENSURE ACTIVE MUSCLE HEALTH) Liqd Take 118 mLs by mouth 3 (three) times daily with meals. 90 Can 5    furosemide (LASIX) 40 MG tablet Take 1 tablet (40 mg total) by mouth once daily. (Patient taking differently: Take 40 mg by mouth as needed. ) 30 tablet 11    melatonin 3 mg Lozg Take 1 capsule by mouth every evening.         memantine (NAMENDA) 10 MG Tab Take 1 tablet (10 mg total) by mouth 2 (two) times daily. 60 tablet 11    paroxetine (PAXIL) 10 MG tablet Take 1 tablet (10 mg total) by mouth every morning. 30 tablet 11    polyethylene glycol (GLYCOLAX) 17 gram PwPk Take by mouth.        pravastatin (PRAVACHOL) 40 MG tablet Take 1 tablet (40 mg total) by mouth every evening. 30 tablet 6    apixaban 5 mg Tab Take 1 tablet (5 mg total) by mouth 2 (two) times daily. 60 tablet 11      No facility-administered encounter medications on file as of 3/28/2017.          Review of Systems   General ROS: GENERAL: No weight gain or loss  SKIN: No rashes or lacerations  HEAD: No headaches  EYES: No exophthalmos, jaundice or blindness  EARS: No dizziness, tinnitus or hearing loss  NOSE: No changes in smell  MOUTH & THROAT: No dyskinetic movements or obvious goiter  CHEST: No shortness of breath, hyperventilation or cough  CARDIOVASCULAR: No tachycardia or chest pain  ABDOMEN: No nausea, vomiting, pain, constipation or diarrhea  URINARY: No frequency, dysuria or sexual dysfunction  ENDOCRINE: No polydipsia, polyuria  MUSCULOSKELETAL: No pain or stiffness of the joints  NEUROLOGIC: No weakness, sensory changes, seizures, confusion, memory loss, tremor or other abnormal movements  Physical Exam           Vitals:     03/28/17 1407   BP: (!) 124/59   Pulse: (!) 54      General Appearance:  On a wheel chair.  Alert, cooperative, no distress, appears stated age   Head:  Normocephalic, without obvious abnormality, atraumatic   Eyes:  PERRL, conjunctiva/corneas clear, EOM's intact, fundi benign, both eyes   Ears:  Normal TM's and external ear canals, both ears   Nose: Nares normal, septum midline, mucosa normal, no drainage or sinus tenderness   Throat: Lips, mucosa, and tongue normal; teeth and gums normal   Neck: Supple, symmetrical, trachea midline, no adenopathy, thyroid: not enlarged, symmetric, no tenderness/mass/nodules, no carotid bruit or JVD   Back:  Symmetric, no curvature, ROM normal, no CVA tenderness   Lungs:  Clear to auscultation bilaterally, respirations unlabored   Chest Wall:  No tenderness or deformity   Heart:  Regular rate and rhythm, S1, S2 normal, no murmur, rub or gallop   Abdomen:  Soft, non-tender, bowel sounds active all four quadrants, no masses, no organomegaly of liver and spleen  No hernia noted               Extremities: Extremities normal, atraumatic, no cyanosis or edema   Pulses: 2+ and symmetric   Skin: Skin color, texture, turgor normal, no rashes or lesions   Lymph nodes: Cervical, supraclavicular, and axillary nodes normal            Prostate deferred.      LABS:        Lab Results   Component Value Date     PSA 3.9 10/26/2012     PSA 3.49 10/07/2011     PSA 3.4 10/06/2010     PSA 3.4 02/01/2010     PSA 3.7 07/30/2009     PSA 3.2 03/30/2009     PSA 3.2 01/29/2009     PSA 3.9 04/21/2008     PSA 6.6 (H) 03/06/2008     PSA 4.2 (H) 02/23/2007            Results for orders placed or performed in visit on 10/26/12   Prostate Specific Antigen, Diagnostic   Result Value Ref Range     PSA, SCREEN 3.9 0 - 4.0 ng/ml            Lab Results   Component Value Date     CREATININE 1.3 03/14/2017     CREATININE 1.1 08/09/2016     CREATININE 1.2 08/02/2016      No results found for this or any  previous visit.        Urine Culture, Routine   Date Value Ref Range Status   03/22/2017 No growth   Final         Assessment and Plan:  Salty was seen today for hematuria.     Diagnoses and all orders for this visit:     BPH (benign prostatic hypertrophy) with urinary obstruction     Hematuria, gross  - CBC Without Differential; Future  - CT Urogram Abd Pelvis W WO; Future  - Cystoscopy; Future  - lidocaine HCl 2% urojet; Place into the urethra once.  - ciprofloxacin HCl tablet 500 mg; Take 2 tablets (500 mg total) by mouth once.  - Cytology, urine; Standing     will proceed with a full hematuria work up, i.e. Ct urogram for upper tract evaluation and cysto for lower tract evaluation.  I suggested that his cardiologist should be aware of having hematuria since he started Eliquis (Apixaban) not too long ago.  Continue Finasteride.     Follow-up:  Return for cysto.

## 2017-05-09 NOTE — INTERVAL H&P NOTE
The patient has been examined and the H&P has been reviewed:  CT urogram  0.4 cm distal left ureteral stone 1-2 cm proximal to the left UVJ.  No evidence of left-sided hydroureteronephrosis    There is mild urinary bladder wall thickening and prostatomegaly.  Bladder outlet obstruction should be considered.    Age-indeterminate compression fracture of the L1 vertebral body.    Several stable appearing left-sided peripelvic cysts.    Small hiatal hernia.    Repeat CT RSS 5/4/17  Previously identified distal left ureteral stone has passed. There is a 0.5 cm stone now identified within urinary bladder. Though this stone is near the right UVJ, it is favored to be within the bladder lumen and likely represents the recently passed left ureteral stone.    Probable left parapelvic cysts, similar to prior.     Prostatomegaly.    Circumferential bladder wall thickening which may be due to incomplete distention, chronic bladder outlet obstruction, or cystitis.    I concur with the findings and no changes have occurred since H&P was written.        There are no hospital problems to display for this patient.

## 2017-05-09 NOTE — PROCEDURES
Procedure Date:  05/09/2017      Procedure:  Male Diagnostic Cystourethroscopy    Pre-op diagnosis: gross hematuria, BPH with obstruction, left ureteral stone  Post-op diagnosis: bladder stone  Anesthesia: Local  Surgeon:  Wilfredo Ledezma MD    Findings:  Urethra:  Normal urethra.   Sphincter: competent.  Prostate: Estimated Length Prostatic Urethra: 6.5 cm with trilobar obstruction, huge intravesical lobe prostate  Bladder neck: patent with no stricture  Bladder:  Normal bladder.   Normal ureteral orifices bilaterally.   Severe  trabeculation with early sacculation.   Bladder stone seen ( 0.6 x 1 cm stone) on the retroflexed position of the cystoscopic exam    Description of Procedure:                                                         Informed Consent:                                                            - Risks, benefits and alternatives of procedure discussed with               patient and informed consent obtained.       Patient Position:   - Supine. --- Bladder ---   Prep and Drape:   - Patient prepped and draped in usual sterile fashion using povidone     iodine (Betadine).   Instruments:   - 16 Fr flexible cystoscope with 0 degree lens.   Procedure Details:   - Cystoscope passed under vision into bladder.   - Bladder and urethra examined in their entirety with findings as     above.     Conclusion:  1. Bladder stone  2. BPH with obstruction  3. On anti-coagulation therapy in pt with dementia    Recommend to continue finasteride and flomax.  Follow up in 6 months with CT RSS for the bladder stone.  May need bladder stone removal surgery if gross hematuria recurs.  For BPH with obstruction, may need open suprapubic prostatectomy due to its size or bipolar TURP of the intravesical lobe of the prostate    Plan:  Patient was discharged home in a stable condition.  Medications: cipro   Follow up:  6 months    Assessment   BPH (benign prostatic hypertrophy) with urinary obstruction  -     Discontinue:  finasteride (PROSCAR) 5 mg tablet; Take 1 tablet (5 mg total) by mouth once daily.  Dispense: 30 tablet; Refill: 11  -     Discontinue: tamsulosin (FLOMAX) 0.4 mg Cp24; Take 1 capsule (0.4 mg total) by mouth once daily.  Dispense: 30 capsule; Refill: 11  -     tamsulosin (FLOMAX) 0.4 mg Cp24; Take 1 capsule (0.4 mg total) by mouth once daily.  Dispense: 30 capsule; Refill: 11  -     finasteride (PROSCAR) 5 mg tablet; Take 1 tablet (5 mg total) by mouth once daily.  Dispense: 30 tablet; Refill: 11    Hematuria, gross  -     Cystoscopy; Standing  -     Cystoscopy  -     Cytology, urine; Standing  -     lidocaine HCl 2% urojet; Place into the urethra once.  -     ciprofloxacin HCl tablet 500 mg; Take 1 tablet (500 mg total) by mouth once.    Bladder stone  -     CT Renal Stone Study ABD Pelvis WO; Future; Expected date: 5/9/17

## 2017-05-09 NOTE — IP AVS SNAPSHOT
[facility]  [address] [address]  Phone: [phone]  Fax: [phone]                  Salty Farnsworth   2017  9:15 AM   Cystoscopy    Description:  Male : 1934   Provider:  GAURANG BROWN   Department:  [facility]           Visit Information     Date & Time Provider Department    2017  9:15 AM KASEY BROWNY [facility]      Recent Lab Values        2015 2016 2016 3/22/2017                  2:54 PM 12:25 PM  5:27 PM  6:09 PM        Urine Culture ESCHERICHIA COLI  &gt;100,000 cfu/ml   No growth - No growth        Color - Yellow Yellow Dori        Specific Gravity - 1.020 1.020 1.025        pH - 6.0 6.0 5.0        Nitrite - Negative Negative Negative        Ketones - Negative Negative Negative        Urobilinogen - Negative 1.0 2.0-3.0 (A)                 Reason for Visit     Hematuria           Diagnoses this Visit        Comments    Hematuria, gross                To Do List           Your Scheduled Appointments     2017 11:00 AM CDT   Pacemaker Tune Up with PACEMAKER, ICD   [facility] ([facility] )    [address]  [place] [address] [phone]              Goals (5 Years of Data)     None           Medications                ** Verify the list of medication(s) below is accurate and up to date. Carry this with you in case of emergency. If your medications have changed, please notify your healthcare provider.             Medication List      TAKE these medications        Additional Info                      apixaban 5 mg Tab   Quantity:  60 tablet   Refills:  11   Dose:  5 mg    Instructions:  Take 1 tablet (5 mg total) by mouth 2 (two) times daily.     Begin Date    AM    Noon    PM    Bedtime       cetirizine 10 MG tablet   Commonly known as:  ZYRTEC   Refills:  0   Dose:  10 mg    Instructions:  Take 10 mg by mouth once daily.     Begin  Date    AM    Noon    PM    Bedtime       dextromethorphan-guaifenesin 5-100 mg/5 mL Liqd   Refills:  0   Dose:  5 mL    Instructions:  Take 5 mLs by mouth 2 (two) times daily.     Begin Date    AM    Noon    PM    Bedtime       donepezil 10 MG tablet   Commonly known as:  ARICEPT   Quantity:  30 tablet   Refills:  6   Dose:  10 mg    Instructions:  Take 1 tablet (10 mg total) by mouth once daily.     Begin Date    AM    Noon    PM    Bedtime       finasteride 5 mg tablet   Commonly known as:  PROSCAR   Quantity:  30 tablet   Refills:  11   Dose:  5 mg    Instructions:  Take 1 tablet (5 mg total) by mouth once daily.     Begin Date    AM    Noon    PM    Bedtime       food supplemt, lactose-reduced Liqd   Commonly known as:  ENSURE ACTIVE MUSCLE HEALTH   Quantity:  90 Can   Refills:  5   Dose:  118 mL    Instructions:  Take 118 mLs by mouth 3 (three) times daily with meals.     Begin Date    AM    Noon    PM    Bedtime       furosemide 40 MG tablet   Commonly known as:  LASIX   Quantity:  30 tablet   Refills:  11   Dose:  40 mg    Instructions:  Take 1 tablet (40 mg total) by mouth once daily.     Begin Date    AM    Noon    PM    Bedtime       melatonin 3 mg Lozg   Refills:  0   Dose:  1 capsule    Instructions:  Take 1 capsule by mouth every evening.     Begin Date    AM    Noon    PM    Bedtime       memantine 10 MG Tab   Commonly known as:  NAMENDA   Quantity:  60 tablet   Refills:  11   Dose:  10 mg    Instructions:  Take 1 tablet (10 mg total) by mouth 2 (two) times daily.     Begin Date    AM    Noon    PM    Bedtime       paroxetine 10 MG tablet   Commonly known as:  PAXIL   Quantity:  30 tablet   Refills:  11   Dose:  10 mg    Instructions:  Take 1 tablet (10 mg total) by mouth every morning.     Begin Date    AM    Noon    PM    Bedtime       polyethylene glycol 17 gram Pwpk   Commonly known as:  GLYCOLAX   Refills:  0    Instructions:  Take by mouth.     Begin Date    AM    Noon    PM    Bedtime        "pravastatin 40 MG tablet   Commonly known as:  PRAVACHOL   Quantity:  30 tablet   Refills:  6   Dose:  40 mg    Instructions:  Take 1 tablet (40 mg total) by mouth every evening.     Begin Date    AM    Noon    PM    Bedtime               Your Vitals Were     Temp Resp Height Weight BMI    97.7 °F (36.5 °C) (Oral) 16 5' 5" (1.651 m) 99.8 kg (220 lb) 36.61 kg/m2      Allergies as of 5/9/2017     No Known Allergies      Immunizations Administered on Date of Encounter - 5/9/2017     None      Instructions    What to Expect After a Cystoscopy  For the next 24-48 hours, you may feel a mild burning when you urinate. This burning is normal and expected. Drink plenty of water to dilute the urine to help relieve the burning sensation. You may also see a small amount of blood in your urine after the procedure.    Unless you are already taking antibiotics, you may be given an antibiotic after the test to prevent infection.    Signs and Symptoms to Report  Call the [facility] at [phone] if you develop any of the following:  · Fever of 101 degrees or higher  · Chills or persistent bleeding  · Inability to urinate       Advance Directives     An advance directive is a document which, in the event you are no longer able to make decisions for yourself, tells your healthcare team what kind of treatment you do or do not want to receive, or who you would like to make those decisions for you.  If you do not currently have an advance directive, [facility] encourages you to create one.  For more information call:  [phone]), [phone] ([phone]),  or log on to [email or web address].        [facility]     [facility] Nurse Care Line - 24/7 Assistance  Unless otherwise directed by your provider, please contact [facility], our nurse care line that is available for 24/7 assistance.     Registered nurses in the [facility] provide: appointment scheduling, clinical advisement, " health education, and other advisory services.  Call: [phone] (toll free)          Language Assistance Services     ATTENTION: Language assistance services are available, free of charge. Please call [phone].      ATENCIÓN: Si habla romana, tiene a mas disposición servicios gratuitos de asistencia lingüística. Llame al [phone].     CHÚ Ý: N?u b?n nói Ti?ng Vi?t, có các d?ch v? h? tr? ngôn ng? mi?n phí dành cho b?n. G?i s? [phone].         [facility] complies with applicable Federal civil rights laws and does not discriminate on the basis of race, color, national origin, age, disability, or sex.

## 2017-05-09 NOTE — PATIENT INSTRUCTIONS
What to Expect After a Cystoscopy  For the next 24-48 hours, you may feel a mild burning when you urinate. This burning is normal and expected. Drink plenty of water to dilute the urine to help relieve the burning sensation. You may also see a small amount of blood in your urine after the procedure.    Unless you are already taking antibiotics, you may be given an antibiotic after the test to prevent infection.    Signs and Symptoms to Report  Call the [facility] at [phone] if you develop any of the following:  · Fever of 101 degrees or higher  · Chills or persistent bleeding  · Inability to urinate

## 2017-05-10 ENCOUNTER — TELEPHONE (OUTPATIENT)
Dept: INTERNAL MEDICINE | Facility: CLINIC | Age: 82
End: 2017-05-10

## 2017-06-01 ENCOUNTER — TELEPHONE (OUTPATIENT)
Dept: NEUROLOGY | Facility: CLINIC | Age: 82
End: 2017-06-01

## 2017-06-01 RX ORDER — MEMANTINE HYDROCHLORIDE 10 MG/1
10 TABLET ORAL 2 TIMES DAILY
Qty: 60 TABLET | Refills: 11
Start: 2017-06-01 | End: 2018-01-02 | Stop reason: SDUPTHER

## 2017-06-01 NOTE — TELEPHONE ENCOUNTER
Approval for refill of Memantine 10 mg with eleven refills has been faxed to [place] at F # [phone] as per .

## 2017-06-29 ENCOUNTER — TELEPHONE (OUTPATIENT)
Dept: INTERNAL MEDICINE | Facility: CLINIC | Age: 82
End: 2017-06-29

## 2017-06-29 RX ORDER — CIPROFLOXACIN 500 MG/1
500 TABLET ORAL 2 TIMES DAILY
Qty: 14 TABLET | Refills: 0 | Status: SHIPPED | OUTPATIENT
Start: 2017-06-29 | End: 2017-07-06

## 2017-06-29 NOTE — TELEPHONE ENCOUNTER
Spoke with patient daughter Callie.  She states it seems that patient has a UTI.  Offered an appointment for this afternoon.  States he's very difficult to transport.  She also states its difficult to get a urine specimen from patient.  He needs to be cath, and cannot go in a cup.    Patient doesn't currently have home health, and does have a urologist he sees.    Please advise.

## 2017-06-29 NOTE — TELEPHONE ENCOUNTER
----- Message from Krystalkamar Rice sent at 6/29/2017  8:41 AM CDT -----  Contact: Callie Swaant, [phone]  Patient would like to get medical advice.  Symptoms (please be specific):  Strong urine odor and Lathargic  How long has patient had these symptoms:  1 Week  Pharmacy name and phone #:  [facility], [phone]  Any drug allergies:  NKDA  Comments:

## 2017-07-07 ENCOUNTER — PATIENT MESSAGE (OUTPATIENT)
Dept: INTERNAL MEDICINE | Facility: CLINIC | Age: 82
End: 2017-07-07

## 2017-07-18 ENCOUNTER — TELEPHONE (OUTPATIENT)
Dept: ELECTROPHYSIOLOGY | Facility: CLINIC | Age: 82
End: 2017-07-18

## 2017-07-27 ENCOUNTER — TELEPHONE (OUTPATIENT)
Dept: INTERNAL MEDICINE | Facility: CLINIC | Age: 82
End: 2017-07-27

## 2017-07-27 ENCOUNTER — CLINICAL SUPPORT (OUTPATIENT)
Dept: ELECTROPHYSIOLOGY | Facility: CLINIC | Age: 82
End: 2017-07-27
Payer: MEDICARE

## 2017-07-27 DIAGNOSIS — Z95.0 CARDIAC PACEMAKER IN SITU: ICD-10-CM

## 2017-07-27 DIAGNOSIS — R00.1 BRADYCARDIA: ICD-10-CM

## 2017-07-27 PROCEDURE — 93294 REM INTERROG EVL PM/LDLS PM: CPT | Mod: S$GLB,,, | Performed by: INTERNAL MEDICINE

## 2017-07-27 PROCEDURE — 93296 REM INTERROG EVL PM/IDS: CPT | Mod: S$GLB,,, | Performed by: INTERNAL MEDICINE

## 2017-07-27 NOTE — TELEPHONE ENCOUNTER
Received records from outside laboratory.  Creatinine 1.2, GFR 61.  This is unchanged from prior checks.

## 2017-08-17 DIAGNOSIS — Z95.0 CARDIAC PACEMAKER IN SITU: Primary | ICD-10-CM

## 2017-09-14 ENCOUNTER — OFFICE VISIT (OUTPATIENT)
Dept: INTERNAL MEDICINE | Facility: CLINIC | Age: 82
End: 2017-09-14
Payer: MEDICARE

## 2017-09-14 ENCOUNTER — LAB VISIT (OUTPATIENT)
Dept: LAB | Facility: HOSPITAL | Age: 82
End: 2017-09-14
Attending: INTERNAL MEDICINE
Payer: MEDICARE

## 2017-09-14 VITALS
SYSTOLIC BLOOD PRESSURE: 129 MMHG | TEMPERATURE: 98 F | HEIGHT: 66 IN | DIASTOLIC BLOOD PRESSURE: 79 MMHG | BODY MASS INDEX: 33.94 KG/M2 | WEIGHT: 211.19 LBS | HEART RATE: 64 BPM

## 2017-09-14 DIAGNOSIS — J30.1 CHRONIC SEASONAL ALLERGIC RHINITIS DUE TO POLLEN: ICD-10-CM

## 2017-09-14 DIAGNOSIS — R13.10 DYSPHAGIA, UNSPECIFIED TYPE: ICD-10-CM

## 2017-09-14 DIAGNOSIS — N40.1 BENIGN PROSTATIC HYPERPLASIA WITH URINARY OBSTRUCTION: Chronic | ICD-10-CM

## 2017-09-14 DIAGNOSIS — L22 DIAPER RASH: ICD-10-CM

## 2017-09-14 DIAGNOSIS — I50.32 CHRONIC DIASTOLIC HEART FAILURE: Chronic | ICD-10-CM

## 2017-09-14 DIAGNOSIS — R41.3 MEMORY LOSS: Chronic | ICD-10-CM

## 2017-09-14 DIAGNOSIS — R82.90 FOUL SMELLING URINE: ICD-10-CM

## 2017-09-14 DIAGNOSIS — E78.5 HYPERLIPIDEMIA, UNSPECIFIED HYPERLIPIDEMIA TYPE: ICD-10-CM

## 2017-09-14 DIAGNOSIS — F43.21 ADJUSTMENT DISORDER WITH DEPRESSED MOOD: ICD-10-CM

## 2017-09-14 DIAGNOSIS — E78.5 HYPERLIPIDEMIA, UNSPECIFIED HYPERLIPIDEMIA TYPE: Primary | ICD-10-CM

## 2017-09-14 DIAGNOSIS — N13.8 BENIGN PROSTATIC HYPERPLASIA WITH URINARY OBSTRUCTION: Chronic | ICD-10-CM

## 2017-09-14 LAB
ALBUMIN SERPL BCP-MCNC: 3.4 G/DL
ALP SERPL-CCNC: 77 U/L
ALT SERPL W/O P-5'-P-CCNC: 9 U/L
ANION GAP SERPL CALC-SCNC: 9 MMOL/L
AST SERPL-CCNC: 14 U/L
BASOPHILS # BLD AUTO: 0.03 K/UL
BASOPHILS NFR BLD: 0.6 %
BILIRUB SERPL-MCNC: 0.6 MG/DL
BUN SERPL-MCNC: 18 MG/DL
CALCIUM SERPL-MCNC: 8.9 MG/DL
CHLORIDE SERPL-SCNC: 110 MMOL/L
CHOLEST SERPL-MCNC: 153 MG/DL
CHOLEST/HDLC SERPL: 4.3 {RATIO}
CO2 SERPL-SCNC: 23 MMOL/L
CREAT SERPL-MCNC: 1.3 MG/DL
DIFFERENTIAL METHOD: ABNORMAL
EOSINOPHIL # BLD AUTO: 0.2 K/UL
EOSINOPHIL NFR BLD: 3.8 %
ERYTHROCYTE [DISTWIDTH] IN BLOOD BY AUTOMATED COUNT: 13.7 %
EST. GFR  (AFRICAN AMERICAN): 58.3 ML/MIN/1.73 M^2
EST. GFR  (NON AFRICAN AMERICAN): 50.5 ML/MIN/1.73 M^2
GLUCOSE SERPL-MCNC: 86 MG/DL
HCT VFR BLD AUTO: 37.8 %
HDLC SERPL-MCNC: 36 MG/DL
HDLC SERPL: 23.5 %
HGB BLD-MCNC: 12.8 G/DL
LDLC SERPL CALC-MCNC: 96.2 MG/DL
LYMPHOCYTES # BLD AUTO: 2 K/UL
LYMPHOCYTES NFR BLD: 39.4 %
MAGNESIUM SERPL-MCNC: 2.2 MG/DL
MCH RBC QN AUTO: 31 PG
MCHC RBC AUTO-ENTMCNC: 33.9 G/DL
MCV RBC AUTO: 92 FL
MONOCYTES # BLD AUTO: 0.5 K/UL
MONOCYTES NFR BLD: 9.4 %
NEUTROPHILS # BLD AUTO: 2.4 K/UL
NEUTROPHILS NFR BLD: 46.8 %
NONHDLC SERPL-MCNC: 117 MG/DL
PLATELET # BLD AUTO: 228 K/UL
PMV BLD AUTO: 10.6 FL
POTASSIUM SERPL-SCNC: 3.6 MMOL/L
PROT SERPL-MCNC: 7.2 G/DL
RBC # BLD AUTO: 4.13 M/UL
SODIUM SERPL-SCNC: 142 MMOL/L
TRIGL SERPL-MCNC: 104 MG/DL
TSH SERPL DL<=0.005 MIU/L-ACNC: 2.13 UIU/ML
WBC # BLD AUTO: 5.02 K/UL

## 2017-09-14 PROCEDURE — 99499 UNLISTED E&M SERVICE: CPT | Mod: S$GLB,,, | Performed by: INTERNAL MEDICINE

## 2017-09-14 PROCEDURE — 1157F ADVNC CARE PLAN IN RCRD: CPT | Mod: S$GLB,,, | Performed by: INTERNAL MEDICINE

## 2017-09-14 PROCEDURE — 84443 ASSAY THYROID STIM HORMONE: CPT

## 2017-09-14 PROCEDURE — 85025 COMPLETE CBC W/AUTO DIFF WBC: CPT

## 2017-09-14 PROCEDURE — 80061 LIPID PANEL: CPT

## 2017-09-14 PROCEDURE — 99214 OFFICE O/P EST MOD 30 MIN: CPT | Mod: S$GLB,,, | Performed by: INTERNAL MEDICINE

## 2017-09-14 PROCEDURE — 36415 COLL VENOUS BLD VENIPUNCTURE: CPT | Mod: PO

## 2017-09-14 PROCEDURE — 83735 ASSAY OF MAGNESIUM: CPT

## 2017-09-14 PROCEDURE — 3008F BODY MASS INDEX DOCD: CPT | Mod: S$GLB,,, | Performed by: INTERNAL MEDICINE

## 2017-09-14 PROCEDURE — 1126F AMNT PAIN NOTED NONE PRSNT: CPT | Mod: S$GLB,,, | Performed by: INTERNAL MEDICINE

## 2017-09-14 PROCEDURE — 1159F MED LIST DOCD IN RCRD: CPT | Mod: S$GLB,,, | Performed by: INTERNAL MEDICINE

## 2017-09-14 PROCEDURE — 99999 PR PBB SHADOW E&M-EST. PATIENT-LVL III: CPT | Mod: PBBFAC,,, | Performed by: INTERNAL MEDICINE

## 2017-09-14 PROCEDURE — 80053 COMPREHEN METABOLIC PANEL: CPT

## 2017-09-14 PROCEDURE — 3074F SYST BP LT 130 MM HG: CPT | Mod: S$GLB,,, | Performed by: INTERNAL MEDICINE

## 2017-09-14 PROCEDURE — 3078F DIAST BP <80 MM HG: CPT | Mod: S$GLB,,, | Performed by: INTERNAL MEDICINE

## 2017-09-14 RX ORDER — FUROSEMIDE 40 MG/1
40 TABLET ORAL DAILY
Qty: 30 TABLET | Refills: 11 | Status: SHIPPED | OUTPATIENT
Start: 2017-09-14 | End: 2018-09-14

## 2017-09-14 RX ORDER — PROMETHAZINE HYDROCHLORIDE AND DEXTROMETHORPHAN HYDROBROMIDE 6.25; 15 MG/5ML; MG/5ML
SYRUP ORAL
COMMUNITY

## 2017-09-14 RX ORDER — LEVOCETIRIZINE DIHYDROCHLORIDE 5 MG/1
5 TABLET, FILM COATED ORAL NIGHTLY
Qty: 30 TABLET | Refills: 6 | Status: SHIPPED | OUTPATIENT
Start: 2017-09-14 | End: 2018-09-14

## 2017-09-14 RX ORDER — AZELASTINE 1 MG/ML
1 SPRAY, METERED NASAL 2 TIMES DAILY
Qty: 30 ML | Refills: 11 | Status: SHIPPED | OUTPATIENT
Start: 2017-09-14

## 2017-09-14 RX ORDER — FLUTICASONE PROPIONATE 50 MCG
1 SPRAY, SUSPENSION (ML) NASAL DAILY
COMMUNITY
End: 2018-01-29 | Stop reason: SDUPTHER

## 2017-09-14 RX ORDER — KETOCONAZOLE 20 MG/G
CREAM TOPICAL 2 TIMES DAILY
Qty: 60 G | Refills: 2 | Status: SHIPPED | OUTPATIENT
Start: 2017-09-14

## 2017-09-14 RX ORDER — MUPIROCIN 20 MG/G
OINTMENT TOPICAL 3 TIMES DAILY
COMMUNITY

## 2017-09-14 NOTE — PROGRESS NOTES
Subjective:       Patient ID: Salty Farnsworth is a 83 y.o. male.    Chief Complaint: Follow-up (general check up)    HPI     84 yo male here for follow-up of chronic medical conditions.    BPH - flomax 0.4 mg, proscar 5 mg.  He goes in the diaper.  His daughter reports that his urine has a strong smell.  He has no other signs of a UTI currently.    HLD - Patient is currently on pravastatin 40 mg.  His last lipid panel was   Cholesterol   Date Value Ref Range Status   11/09/2015 130 120 - 199 mg/dL Final     Comment:     The National Cholesterol Education Program (NCEP) has set the  following guidelines (reference ranges) for Cholesterol:  Optimal.....................<200 mg/dL  Borderline High.............200-239 mg/dL  High........................> or = 240 mg/dL       Triglycerides   Date Value Ref Range Status   11/09/2015 86 30 - 150 mg/dL Final     Comment:     The National Cholesterol Education Program (NCEP) has set the  following guidelines (reference values) for triglycerides:  Normal......................<150 mg/dL  Borderline High.............150-199 mg/dL  High........................200-499 mg/dL       HDL   Date Value Ref Range Status   11/09/2015 34 (L) 40 - 75 mg/dL Final     Comment:     The National Cholesterol Education Program (NCEP) has set the  following guidelines (reference values) for HDL Cholesterol:  Low...............<40 mg/dL  Optimal...........>60 mg/dL       LDL Cholesterol   Date Value Ref Range Status   11/09/2015 78.8 63.0 - 159.0 mg/dL Final     Comment:     The National Cholesterol Education Program (NCEP) has set the  following guidelines (reference values) for LDL Cholesterol:  Optimal.......................<130 mg/dL  Borderline High...............130-159 mg/dL  High..........................160-189 mg/dL  Very High.....................>190 mg/dL     .  Side effects of the medication: none.    Memory is about the same without significant worsening.  He is on namenda and  aricept.       He has had trouble swallowing and has been on a mechanical soft diet, which seems to have helped the trouble swallowing.    His daughter reports that his mood has been good on the paxil 10 mg.    He takes two claritin and zyrtec and still has a sinus drip.  He has been taking this for years.    He has a wound on his backside.     He has swelling of his lower extremities.  His daughters have not been giving him a lot of the lasix.  His weight has remained stable    Review of Systems    Objective:      Physical Exam   Constitutional: He is oriented to person, place, and time. He appears well-developed and well-nourished.   HENT:   Head: Normocephalic and atraumatic.   Mouth/Throat: No oropharyngeal exudate.   Eyes: EOM are normal. Pupils are equal, round, and reactive to light. Right eye exhibits no discharge. Left eye exhibits no discharge. No scleral icterus.   Neck: Normal range of motion. Neck supple. No tracheal deviation present. No thyromegaly present.   Cardiovascular: Normal rate, regular rhythm and normal heart sounds.  Exam reveals no gallop and no friction rub.    No murmur heard.  Pulmonary/Chest: Effort normal and breath sounds normal. No respiratory distress. He has no wheezes. He has no rales. He exhibits no tenderness.   Abdominal: Soft. Bowel sounds are normal. He exhibits no distension and no mass. There is no tenderness. There is no rebound and no guarding.   Musculoskeletal: Normal range of motion. He exhibits edema (2+ pitting bilaterally). He exhibits no tenderness.   Neurological: He is alert and oriented to person, place, and time.   Skin: Skin is warm and dry. No rash noted. No erythema. No pallor.   Psychiatric: He has a normal mood and affect. His behavior is normal.   Vitals reviewed.      Assessment:       1. Hyperlipidemia, unspecified hyperlipidemia type    2. Benign prostatic hyperplasia with urinary obstruction    3. Dysphagia, unspecified type    4. Memory loss    5.  Adjustment disorder with depressed mood    6. Chronic seasonal allergic rhinitis due to pollen    7. Chronic diastolic heart failure    8. Foul smelling urine    9. Diaper rash        Plan:       1.  Continue pravastatin 40 mg daily.  2.  Continue proscar 5 mg, flomax 0.4 mg  3.  Agree with mechanical soft diet.  4.  Continue namenda 10 mg, aricept 10mg  5.  Continue paxil 10 mg  6.  Change to xyzal, flonase, astelin  7.  Lasix 40 mg as needed.  8.  Check UA and culture  9.  Ketoconazole ointment twice a day.

## 2017-09-21 ENCOUNTER — PATIENT MESSAGE (OUTPATIENT)
Dept: INTERNAL MEDICINE | Facility: CLINIC | Age: 82
End: 2017-09-21

## 2017-10-04 DIAGNOSIS — I48.0 PAROXYSMAL ATRIAL FIBRILLATION: ICD-10-CM

## 2017-10-04 RX ORDER — DONEPEZIL HYDROCHLORIDE 10 MG/1
10 TABLET, FILM COATED ORAL DAILY
Qty: 30 TABLET | Refills: 11 | Status: SHIPPED | OUTPATIENT
Start: 2017-10-04

## 2017-11-07 ENCOUNTER — TELEPHONE (OUTPATIENT)
Dept: ELECTROPHYSIOLOGY | Facility: CLINIC | Age: 82
End: 2017-11-07

## 2017-11-07 NOTE — TELEPHONE ENCOUNTER
I spoke with patients daughter and asked her to send his remote transmission. She asked that I move his appointment to 11/08/2017. I have done this.

## 2017-11-08 ENCOUNTER — CLINICAL SUPPORT (OUTPATIENT)
Dept: ELECTROPHYSIOLOGY | Facility: CLINIC | Age: 82
End: 2017-11-08
Attending: INTERNAL MEDICINE
Payer: MEDICARE

## 2017-11-08 DIAGNOSIS — Z95.0 CARDIAC PACEMAKER IN SITU: ICD-10-CM

## 2017-11-08 PROCEDURE — 93296 REM INTERROG EVL PM/IDS: CPT | Mod: S$GLB,,, | Performed by: INTERNAL MEDICINE

## 2017-11-08 PROCEDURE — 93294 REM INTERROG EVL PM/LDLS PM: CPT | Mod: S$GLB,,, | Performed by: INTERNAL MEDICINE

## 2017-12-14 ENCOUNTER — PATIENT MESSAGE (OUTPATIENT)
Dept: INTERNAL MEDICINE | Facility: CLINIC | Age: 82
End: 2017-12-14

## 2017-12-19 ENCOUNTER — OFFICE VISIT (OUTPATIENT)
Dept: INTERNAL MEDICINE | Facility: CLINIC | Age: 82
End: 2017-12-19
Payer: MEDICARE

## 2017-12-19 VITALS
HEART RATE: 68 BPM | DIASTOLIC BLOOD PRESSURE: 70 MMHG | SYSTOLIC BLOOD PRESSURE: 121 MMHG | RESPIRATION RATE: 16 BRPM | TEMPERATURE: 98 F | HEIGHT: 66 IN

## 2017-12-19 DIAGNOSIS — N39.0 URINARY TRACT INFECTION WITHOUT HEMATURIA, SITE UNSPECIFIED: Primary | ICD-10-CM

## 2017-12-19 PROCEDURE — 99213 OFFICE O/P EST LOW 20 MIN: CPT | Mod: S$GLB,,, | Performed by: INTERNAL MEDICINE

## 2017-12-19 PROCEDURE — 99999 PR PBB SHADOW E&M-EST. PATIENT-LVL IV: CPT | Mod: PBBFAC,,,

## 2017-12-19 RX ORDER — CIPROFLOXACIN 500 MG/1
500 TABLET ORAL 2 TIMES DAILY
Qty: 14 TABLET | Refills: 0 | Status: SHIPPED | OUTPATIENT
Start: 2017-12-19 | End: 2017-12-26

## 2017-12-19 NOTE — PROGRESS NOTES
Patient seen and examined with resident and agree with assessment and plan.    1.  UTI - cipro 500 mg bid x 1 week.  Try to get UA and culture if this treatment fails.

## 2017-12-19 NOTE — PROGRESS NOTES
"Subjective:       Patient ID: Salty Farnsworth is a 83 y.o. male.    Chief Complaint: Urinary Tract Infection    HPI     Mr. Farnsworth is a 82 yo Male who is here for UTI treatment.    Hx obtained from daughters, who state that he cant stand up straight.  Pt is incontinent, and thus uses diapers.  Daughter states that he has had UTIs 3x a year  They note that his "Off" - not standing stright, falling asleep a lot.  No fevers at home  Last week, noticed that he wasn't himself, Not coherent, chokes on food. On puree food since Thursday but before that he was tolerating a solid diet.      Review of Systems    Review of Systems  Constitutional: denies unintentional weight loss, night sweats, fever or chills  Eyes: denies visual changes  ENT: denies nasal congestion, ear pain or sore throat  Respiratory: denies cough, dyspnea on exertion and pleurisy  Cardiovascular: denies chest pain, chest pressure/discomfort, dyspnea, exertional chest pressure/discomfort, palpitations, lower extremity edema, orthopnea and palpitations  Gastrointestinal: denies nausea or vomiting, abdominal pain or change in bowel habits  Genitourinary: denies hematuria or dysuria  Musculoskeletal: denies arthralgias or myalgias  Neurological: denies seizures or tremors  Behavioral/Psych: denies auditory or visual hallucinations, SI, HI     Objective:      Physical Exam    Physical Exam:  Constitutional: Pt is in wheelchair, alert but not communicative, non-distressed, not diaphoretic.   HENT: NC/AT, external ears normal, oropharynx clear, MMM w/o exudates.   Eyes: PERRL, EOMI, conjunctiva normal, no discharge b/l, no scleral icterus   Neck: normal ROM, supple, (-) thyromegaly/nodules, (-) JVD,(-) cervical adenopathy   CV: RRR, no m/r/g, no carotid bruits, +2 peripheral pulses.  Pulmonary/Chest wall: Breathing comfortably w/o distress, CTAB, no w/r/r, no crackles.  GI: Soft, non-tender, non-distended, (+) BS, (+) BM   Musculoskeletal: Normal ROM, no edema, " no atrophy, no tenderness throughout   Neurological: resting tremor of R hand CN II-XI in tact, nl sensation, nl strength/tone    Psych: dementia at baseline.    Assessment:       1. Urinary tract infection without hematuria, site unspecified        Plan:       1) UTI  -cipro 500 BID for 7 days  -told to monitor BP and temperature  -RTC as needed    Case discussed with Dr. Cooper Pond MD  Internal Medicine, PGY2  Pager [phone]

## 2018-01-02 RX ORDER — PRAVASTATIN SODIUM 40 MG/1
40 TABLET ORAL NIGHTLY
Qty: 30 TABLET | Refills: 6 | Status: SHIPPED | OUTPATIENT
Start: 2018-01-02

## 2018-01-02 RX ORDER — MEMANTINE HYDROCHLORIDE 10 MG/1
10 TABLET ORAL 2 TIMES DAILY
Qty: 60 TABLET | Refills: 11 | Status: SHIPPED | OUTPATIENT
Start: 2018-01-02 | End: 2019-01-02

## 2018-01-29 RX ORDER — FLUTICASONE PROPIONATE 50 MCG
1 SPRAY, SUSPENSION (ML) NASAL DAILY
Qty: 16 G | Refills: 5 | Status: SHIPPED | OUTPATIENT
Start: 2018-01-29

## 2018-02-08 ENCOUNTER — CLINICAL SUPPORT (OUTPATIENT)
Dept: ELECTROPHYSIOLOGY | Facility: CLINIC | Age: 83
End: 2018-02-08
Attending: INTERNAL MEDICINE
Payer: MEDICARE

## 2018-02-08 DIAGNOSIS — Z95.0 CARDIAC PACEMAKER IN SITU: ICD-10-CM

## 2018-02-08 PROCEDURE — 93294 REM INTERROG EVL PM/LDLS PM: CPT | Mod: S$GLB,,, | Performed by: INTERNAL MEDICINE

## 2018-02-08 PROCEDURE — 93296 REM INTERROG EVL PM/IDS: CPT | Mod: S$GLB,,, | Performed by: INTERNAL MEDICINE

## 2018-02-19 ENCOUNTER — PES CALL (OUTPATIENT)
Dept: ADMINISTRATIVE | Facility: CLINIC | Age: 83
End: 2018-02-19

## 2018-03-14 ENCOUNTER — TELEPHONE (OUTPATIENT)
Dept: INTERNAL MEDICINE | Facility: CLINIC | Age: 83
End: 2018-03-14

## 2018-03-14 NOTE — TELEPHONE ENCOUNTER
I was unaware of the patient's death.  Please call to find out the circumstances surrounding the death.

## 2018-03-14 NOTE — TELEPHONE ENCOUNTER
----- Message from Donna Hodge sent at 3/14/2018  3:18 PM CDT -----  Contact: Shaunna/ Julissa corbett [facility] [place] [phone]  Please let them know if you will be signing the patients death certificate.

## 2018-03-15 NOTE — TELEPHONE ENCOUNTER
Spoke with Donal (Shaunna is out of the office today) / [facility] Re: pt death; Mr Farnsworth passed on 3/13/18 10:42pm; pt passed at:     [facility] in [place]. [phone]    [facility]:     Spoke with Karen; transferred to [facility]; facility does not keep paperwork on  residents; stated ambulance pronounced pt upon arrival; direct cause of death is not known.; pt was found  in bed.

## 2018-11-09 ENCOUNTER — TELEPHONE (OUTPATIENT)
Dept: ELECTROPHYSIOLOGY | Facility: CLINIC | Age: 83
End: 2018-11-09

## 2018-11-09 NOTE — TELEPHONE ENCOUNTER
----- Message from Hafsa Mackay sent at 11/9/2018 10:49 AM CST -----  Contact: patient's daughter  The Pt's daughter is calling to see what should she do with his home monitor device. Please call her back @ [phone]. Thanks, Hafsa

## 2018-11-09 NOTE — TELEPHONE ENCOUNTER
Patient is . Had a MDT device and home monitor. They want to crhis it to the arrhythmia clinic. Advised that was fine. Ms Callie verbalized understanding.            ----- Message from Hasfa Mackay sent at 2018 10:49 AM CST -----  Contact: patient's daughter  The Pt's daughter is calling to see what should she do with his home monitor device. Please call her back @ [phone]. Thanks, Hafsa